# Patient Record
(demographics unavailable — no encounter records)

---

## 2024-10-23 NOTE — HISTORY OF PRESENT ILLNESS
[FreeTextEntry1] : ALEX SHEEHAN is a 1 month old boy who presents for initial evaluation for shaking episodes.  He was referred after a recent ER visit.  A couple weeks ago, parents noted brief episodes of leg shaking.  He will shake his leg (left or right) for a few seconds.  This occurs when he is awake, often when he is getting his diaper changed.  Does not occur during sleep. There is no associated change in his behavior, eye movements, or tone.  Mom took a video of episode and showed to her pediatrician who advised being seen in Urgent Care.  She was referred to ER where labs were normal (CBC, CMP including electrolytes and glucose).  He had a normal exam and was referred to neurology.  Born FT, via elective  following uncomplicated pregnancy.  Normal delivery. He is  and taking EHM, no feeding issues.  Takes vitamin D supplement.  Normal weight gain.  FHx: 3 yo sister is healthy.  No family history of seizures, DD, neurological conditions in infancy.

## 2024-10-23 NOTE — ASSESSMENT
[FreeTextEntry1] : 37 day old full term baby with brief tremulous movements of either leg during wakefulness.  Normal neurological exam and development for age.

## 2024-10-23 NOTE — REASON FOR VISIT
[Initial Consultation] : an initial consultation for [Parents] : parents [Medical Records] : medical records [FreeTextEntry2] : shaking episodes

## 2024-10-23 NOTE — PHYSICAL EXAM
[Well-appearing] : well-appearing [Normocephalic] : normocephalic [Anterior fontanel- Open] : anterior fontanel- open [Anterior fontanel- Soft] : anterior fontanel- soft [Anterior fontanel- Flat] : anterior fontanel- flat [No dysmorphic facial features] : no dysmorphic facial features [No ocular abnormalities] : no ocular abnormalities [Neck supple] : neck supple [Soft] : soft [No organomegaly] : no organomegaly [No deformities] : no deformities [Alert] : alert [Regards] : regards [Pupils reactive to light] : pupils reactive to light [Turns to light] : turns to light [Tracks face, light or objects with full extraocular movements] : tracks face, light or objects with full extraocular movements [No facial asymmetry or weakness] : no facial asymmetry or weakness [No nystagmus] : no nystagmus [Responds to voice/sounds] : responds to voice/sounds [Midline tongue] : midline tongue [No fasciculations] : no fasciculations [Normal axial and appendicular muscle tone with symmetric limb movements] : normal axial and appendicular muscle tone with symmetric limb movements [Normal bulk] : normal bulk [Lift head in prone] : lift head in prone [No abnormal involuntary movements] : no abnormal involuntary movements [2+ biceps] : 2+ biceps [Knee jerks] : knee jerks [Ankle jerks] : ankle jerks [No ankle clonus] : no ankle clonus [Responds to touch and tickle] : responds to touch and tickle [de-identified] : nonlabored breathing [de-identified] : normal bret, no exaggerated startle

## 2024-10-23 NOTE — CONSULT LETTER
[Dear  ___] : Dear  [unfilled], [Consult Letter:] : I had the pleasure of evaluating your patient, [unfilled]. [Please see my note below.] : Please see my note below. [Consult Closing:] : Thank you very much for allowing me to participate in the care of this patient.  If you have any questions, please do not hesitate to contact me. [Sincerely,] : Sincerely, [FreeTextEntry3] : Lucía Loredo MD Child Neurologist [address], [address] [place] [address] Phone: [phone]

## 2024-10-23 NOTE — PLAN
[FreeTextEntry1] : No abnormal movements witnessed during exam Reviewed video, which looks c/w a benign  tremor.  Discussed lightly touching or passively flexing the involved extremity during the episodes to confirm it's suppressible, and that these movements are expected to resolve over time EEG ordered, low suspicion for seizure If normal EEG, then can follow up as needed (parent will call for EEG results)